# Patient Record
(demographics unavailable — no encounter records)

---

## 2024-11-19 NOTE — HISTORY OF PRESENT ILLNESS
[FreeTextEntry1] : [Dr. Summers previous notes] This is a 62yo healthy male with elevated PSA to 5.2. PSA trend: 12/2018 = 6 3/2019 = < 4 12/2019 = > 4 2/2020 5.2. IPSS = 2, mild urgency, not bothersome. No ED.  No family history of Pca.  9/14/22 Here to re-establish care. Lost to follow up due to pandemic, never got MRI.  Had repeat PSA 2021 which was 0.47 Recent PSA 9/2022 = 7.44   4/26/23 Here for f/u.  MRI 10/2022 no suspicious lesions, volume 56gm TP biopsy 10/2022 benign  Feeling well, no complaints.   10/25/23: here for f/u. No urinary complaints, denies urgency, frequency, hematuria. Saw PCP Dr. Marylee Dilling 9/14/23 where she checked PSA = 5.82.   ************ 3/27/24: Patient returns for follow up.  He is in his usual state of health.  His PSA from December was essentially stable with a negative prostate biopsy last year.   He did note an increase in urinary urgency and frequency last week after being in [facility] and drinking 4-5 Cups of strong coffee.  This has mostly resolved.   PSA (12/1/23) = 6.83  ************** 11/19/24: Patient returns for hx of elevated PSA.  he is doing well and denies any voiding complaints.  His most recent PSA was around 8.  His prior urinary frequency has improved by avoiding excessive caffeine.   PSA (11/8/24) = 8.11

## 2024-11-19 NOTE — ASSESSMENT
[FreeTextEntry1] : 66 you male with elevated PSA s/p negative biopsy.  Hios current PSA is int he same range as it has been in the past.  We will repeat a PSA in 3-6 months.  Thanks